# Patient Record
Sex: FEMALE | URBAN - METROPOLITAN AREA
[De-identification: names, ages, dates, MRNs, and addresses within clinical notes are randomized per-mention and may not be internally consistent; named-entity substitution may affect disease eponyms.]

---

## 2022-10-29 ENCOUNTER — NURSE TRIAGE (OUTPATIENT)
Dept: NURSING | Facility: CLINIC | Age: 47
End: 2022-10-29

## 2022-10-29 NOTE — TELEPHONE ENCOUNTER
"  Low low back. Does not go down leg  Hurts to do any thing other that lay in her bed.   Painful but tolerable. Not tailbone, but it \"comes around\" to front of groin. Higher than upper thighs.  Heat, ice, ibu doesn't work  Nurse Triage SBAR    Is this a 2nd Level Triage? YES, LICENSED PRACTITIONER REVIEW IS REQUIRED    Situation: Patient calling to report severe low back pain. Believes she may have a bulging disk.    Background: Reports she cleared out a storage area recently where she was hauling out heavy things, bending over, etc. Pain began last evening around 6 pm. Patient has been using ice, heat for pain management.    Assessment: Patient reporting low low back pain that does not go down her legs. Reports pain is not in the tailbone region, but it \"comes around\" to the front of her groin, higher than her upper thighs. Denies loss of B/B control, denies difficulty breathing, denies numbness or weakness of legs or feet. Reports pain is 4/10 while laying in her bed. 10/10 when up moving around.     Protocol Recommended Disposition:   See HCP Within 4 Hours (Or PCP Triage)    Recommendation: See HCP within 4 hours (or PCP triage). Care advice and disposition given to patient who agrees with disposition. Reports [facility] in [place] is strictly a ED now, and [facility] would be where she would go if she decides she is unable to tolerate the pain. Patient verbalizes understanding regarding pain control and imaging capabilities of UC and EDs. Verbalizes understanding that if her symptoms worsen she needs to go directly to ED.    Celeste Malik RN on 10/29/2022 at 3:37 PM    Does the patient meet one of the following criteria for ADS visit consideration? No          [address] [place] [address]      Reason for Disposition    [1] SEVERE back pain (e.g., excruciating, unable to do any normal activities) AND [2] not improved 2 hours after pain medicine    Additional Information    Negative: Passed out " (i.e., lost consciousness, collapsed and was not responding)    Negative: Shock suspected (e.g., cold/pale/clammy skin, too weak to stand, low BP, rapid pulse)    Negative: Sounds like a life-threatening emergency to the triager    Negative: Major injury to the back (e.g., MVA, fall > 10 feet or 3 meters, penetrating injury, etc.)    Negative: Followed a tailbone injury    Negative: [1] Pain in the upper back over the ribs (rib cage) AND [2] radiates (travels, goes) into chest    Negative: [1] Pain in the upper back over the ribs (rib cage) AND [2] worsened by coughing (or clearly increases with breathing)    Negative: Back pain during pregnancy    Negative: Pain mainly in flank (i.e., in the side, over the lower ribs or just below the ribs)    Negative: [1] SEVERE back pain (e.g., excruciating) AND [2] sudden onset AND [3] age > 60 years    Negative: [1] Unable to urinate (or only a few drops) > 4 hours AND [2] bladder feels very full (e.g., palpable bladder or strong urge to urinate)    Negative: [1] Loss of bladder or bowel control (urine or bowel incontinence; wetting self, leaking stool) AND [2] new-onset    Negative: Numbness in groin or rectal area (i.e., loss of sensation)    Negative: [1] SEVERE abdominal pain AND [2] present > 1 hour    Negative: [1] Abdominal pain AND [2] age > 60 years    Negative: Weakness of a leg or foot (e.g., unable to bear weight, dragging foot)    Negative: Unable to walk    Negative: Patient sounds very sick or weak to the triager    Protocols used: BACK PAIN-A-AH